# Patient Record
Sex: MALE
[De-identification: names, ages, dates, MRNs, and addresses within clinical notes are randomized per-mention and may not be internally consistent; named-entity substitution may affect disease eponyms.]

---

## 2022-12-08 NOTE — NUR
12/08/22 1414 Marlene Shankar
PATIENT TOLD RN THAT HE ATE 1/2 A SANDWICH AND 1/2 A CAPPACINO
AT 0700 THIS AM, ANESTHESIA AND SURGEON NOTIFIED.

## 2022-12-08 NOTE — NUR
12/08/22 1535 Thomas Bates
SECOND SET OF VITALS DID NOT SAVE IN COMPUTER. HOWEVER, ALL VS WNL. PT
DENIED PAIN AND NAUSEA THROUGHOUT STAY IN STEP DOWN.

## 2023-01-24 ENCOUNTER — HOSPITAL ENCOUNTER (OUTPATIENT)
Dept: HOSPITAL 95 - ER | Age: 63
Setting detail: OBSERVATION
LOS: 2 days | Discharge: HOME | End: 2023-01-26
Attending: INTERNAL MEDICINE | Admitting: HOSPITALIST
Payer: MEDICARE

## 2023-01-24 VITALS — WEIGHT: 189.99 LBS | HEIGHT: 66 IN | BODY MASS INDEX: 30.53 KG/M2

## 2023-01-24 DIAGNOSIS — Z79.899: ICD-10-CM

## 2023-01-24 DIAGNOSIS — D61.818: ICD-10-CM

## 2023-01-24 DIAGNOSIS — K76.82: Primary | ICD-10-CM

## 2023-01-24 DIAGNOSIS — Z86.73: ICD-10-CM

## 2023-01-24 DIAGNOSIS — E11.40: ICD-10-CM

## 2023-01-24 DIAGNOSIS — Z79.84: ICD-10-CM

## 2023-01-24 DIAGNOSIS — Z59.00: ICD-10-CM

## 2023-01-24 DIAGNOSIS — K74.60: ICD-10-CM

## 2023-01-24 PROCEDURE — G0378 HOSPITAL OBSERVATION PER HR: HCPCS

## 2023-01-24 PROCEDURE — G0480 DRUG TEST DEF 1-7 CLASSES: HCPCS

## 2023-01-24 PROCEDURE — A9270 NON-COVERED ITEM OR SERVICE: HCPCS

## 2023-01-24 SDOH — ECONOMIC STABILITY - HOUSING INSECURITY: HOMELESSNESS UNSPECIFIED: Z59.00

## 2023-01-25 LAB
ALBUMIN SERPL BCP-MCNC: 3.2 G/DL (ref 3.4–5)
ALBUMIN/GLOB SERPL: 0.9 {RATIO} (ref 0.8–1.8)
ALT SERPL W P-5'-P-CCNC: 32 U/L (ref 12–78)
ANION GAP SERPL CALCULATED.4IONS-SCNC: 6 MMOL/L (ref 6–16)
AST SERPL W P-5'-P-CCNC: 30 U/L (ref 12–37)
BASOPHILS # BLD AUTO: 0.02 K/MM3 (ref 0–0.23)
BASOPHILS NFR BLD AUTO: 1 % (ref 0–2)
BILIRUB SERPL-MCNC: 0.7 MG/DL (ref 0.1–1)
BUN SERPL-MCNC: 17 MG/DL (ref 8–24)
CALCIUM SERPL-MCNC: 8.3 MG/DL (ref 8.5–10.1)
CHLORIDE SERPL-SCNC: 112 MMOL/L (ref 98–108)
CO2 SERPL-SCNC: 23 MMOL/L (ref 21–32)
CREAT SERPL-MCNC: 0.55 MG/DL (ref 0.6–1.2)
DEPRECATED RDW RBC AUTO: 44.6 FL (ref 35.1–46.3)
EOSINOPHIL # BLD AUTO: 0.1 K/MM3 (ref 0–0.68)
EOSINOPHIL NFR BLD AUTO: 3 % (ref 0–6)
ERYTHROCYTE [DISTWIDTH] IN BLOOD BY AUTOMATED COUNT: 13.7 % (ref 11.7–14.2)
ETHANOL SERPL-MCNC: <3 MG/DL
GLOBULIN SER CALC-MCNC: 3.7 G/DL (ref 2.2–4)
GLUCOSE SERPL-MCNC: 121 MG/DL (ref 70–99)
HCT VFR BLD AUTO: 36.8 % (ref 37–53)
HGB BLD-MCNC: 12.9 G/DL (ref 13.5–17.5)
IMM GRANULOCYTES # BLD AUTO: 0.01 K/MM3 (ref 0–0.1)
IMM GRANULOCYTES NFR BLD AUTO: 0 % (ref 0–1)
LYMPHOCYTES # BLD AUTO: 1.44 K/MM3 (ref 0.84–5.2)
LYMPHOCYTES NFR BLD AUTO: 39 % (ref 21–46)
MAGNESIUM SERPL-MCNC: 2.2 MG/DL (ref 1.6–2.4)
MCHC RBC AUTO-ENTMCNC: 35.1 G/DL (ref 31.5–36.5)
MCV RBC AUTO: 90 FL (ref 80–100)
MONOCYTES # BLD AUTO: 0.34 K/MM3 (ref 0.16–1.47)
MONOCYTES NFR BLD AUTO: 9 % (ref 4–13)
NEUTROPHILS # BLD AUTO: 1.75 K/MM3 (ref 1.96–9.15)
NEUTROPHILS NFR BLD AUTO: 48 % (ref 41–73)
NRBC # BLD AUTO: 0 K/MM3 (ref 0–0.02)
NRBC BLD AUTO-RTO: 0 /100 WBC (ref 0–0.2)
PLATELET # BLD AUTO: 103 K/MM3 (ref 150–400)
POTASSIUM SERPL-SCNC: 3.7 MMOL/L (ref 3.5–5.5)
PROT SERPL-MCNC: 6.9 G/DL (ref 6.4–8.2)
SODIUM SERPL-SCNC: 141 MMOL/L (ref 136–145)

## 2023-01-25 NOTE — NUR
Patient received from ed, assessment done, patient pleasant and cooperative,
very thankful for care.

## 2023-01-25 NOTE — NUR
SHIFT SUMMARY-
PT IS ALERT AND PLEASENTLY CONFUSED AT TIMES. PT BECOMES VERY UPSET WHEN MEAL
TRAYS DONT ARRIVE WHEN HE THINKS THEY SHOULD, HE DID THIS TWICE TODAY WHERE HE
GOT UP AND BEGAN TO GET DRESSED SAYING "THAT'S IT! I AM LEAVING!" WHEN FOOD
ARRIVES HE CALMES DOWN, EATS AND GOES BACK TO SLEEP. PT DID GET INTO THE
SHOWER TODAY ON HIS OWN, IV WAS WRAPPED AFTER HE HAD GOTTEN IT WET. IT STILL
FLUSHED WELL, THEN HE CAUGHT IT ON HIS BLANKETS, AGAIN IT STILL FLUSHED WELL,
REINFORCED THE IV DRESSING WITH COBAN. PT IS CURRENTLY LYING IN BED, CALL
LIGHT IN REACH NO S&S OF DISTRESS. PT LOVES TO LAUGH AND JOKE WITH STAFF OFTEN
PRETENDING TO BE ASLEEP WHEN STAFF COME IN TO SEE HIM. WILL CTM AND PASS ON TO
NIGHT RN IN BEDSIDE REPORT.

## 2023-01-26 LAB
ALBUMIN SERPL BCP-MCNC: 2.9 G/DL (ref 3.4–5)
ANION GAP SERPL CALCULATED.4IONS-SCNC: 5 MMOL/L (ref 6–16)
BUN SERPL-MCNC: 12 MG/DL (ref 8–24)
CALCIUM SERPL-MCNC: 8.1 MG/DL (ref 8.5–10.1)
CHLORIDE SERPL-SCNC: 111 MMOL/L (ref 98–108)
CO2 SERPL-SCNC: 24 MMOL/L (ref 21–32)
CREAT SERPL-MCNC: 0.63 MG/DL (ref 0.6–1.2)
GLUCOSE SERPL-MCNC: 117 MG/DL (ref 70–99)
HCT VFR BLD AUTO: 36 % (ref 37–53)
HGB BLD-MCNC: 12.7 G/DL (ref 13.5–17.5)
PHOSPHATE SERPL-MCNC: 3.3 MG/DL (ref 2.5–4.9)
POTASSIUM SERPL-SCNC: 3.8 MMOL/L (ref 3.5–5.5)
SODIUM SERPL-SCNC: 140 MMOL/L (ref 136–145)

## 2023-01-26 NOTE — NUR
DISCHARGE NOTE-
PT AND HIS FRIEND WERE GIVEN VERBAL AND WRITTEN DISCHARGE INSTRUCTIONS AND
ACKNOWLWEDGED UNDERSTANDING OF THEM. MEDS WERE FAXED TO [facility] PER PT
AND HIS FIREND'S REQUEST. PT PCP HAS CHANGED TO DR MATTHEWS AT [facility], THE PT
CALLED AND SCHEDULED A FOLLOW UP APPOINTMENT ON 1-31-23 AT 2:20. THIS WAS
WRITTEN INTO THE DISCHARGE SUMMARY. PT IV WAS DC'D PRIOR TO DISCHARGE. AFTER
EDUCATION THE PT WAS ESCORTED OUT TO THE [facility] WHERE HIS FRIEND WAS
TRANSPORTING HIM TO HIS TRAILER.

## 2023-01-26 NOTE — NUR
This writer in chart for med information for caregiver.  He had questions
about the lactulose dose. answer found and given to Mr. Car.

## 2023-02-23 ENCOUNTER — HOSPITAL ENCOUNTER (OUTPATIENT)
Dept: HOSPITAL 95 - ORSCSDS | Age: 63
Discharge: HOME | End: 2023-02-23
Attending: OPHTHALMOLOGY
Payer: MEDICARE

## 2023-02-23 VITALS — HEIGHT: 66 IN | BODY MASS INDEX: 33.27 KG/M2 | WEIGHT: 207.01 LBS

## 2023-02-23 DIAGNOSIS — Z79.84: ICD-10-CM

## 2023-02-23 DIAGNOSIS — Z86.73: ICD-10-CM

## 2023-02-23 DIAGNOSIS — E11.9: ICD-10-CM

## 2023-02-23 DIAGNOSIS — Z79.899: ICD-10-CM

## 2023-02-23 DIAGNOSIS — E11.36: Primary | ICD-10-CM

## 2023-02-23 DIAGNOSIS — I10: ICD-10-CM

## 2023-02-23 DIAGNOSIS — H25.12: ICD-10-CM

## 2023-02-23 DIAGNOSIS — K74.60: ICD-10-CM

## 2023-02-23 PROCEDURE — V2632 POST CHMBR INTRAOCULAR LENS: HCPCS

## 2023-02-23 PROCEDURE — 08DK3ZZ EXTRACTION OF LEFT LENS, PERCUTANEOUS APPROACH: ICD-10-PCS | Performed by: OPHTHALMOLOGY

## 2023-03-28 ENCOUNTER — HOSPITAL ENCOUNTER (EMERGENCY)
Dept: HOSPITAL 95 - ER | Age: 63
Discharge: HOME | End: 2023-03-28
Payer: MEDICARE

## 2023-03-28 VITALS — WEIGHT: 206.99 LBS | HEIGHT: 66 IN | BODY MASS INDEX: 33.27 KG/M2

## 2023-03-28 DIAGNOSIS — Z79.84: ICD-10-CM

## 2023-03-28 DIAGNOSIS — E11.9: ICD-10-CM

## 2023-03-28 DIAGNOSIS — Z79.899: ICD-10-CM

## 2023-03-28 DIAGNOSIS — R42: Primary | ICD-10-CM

## 2023-03-28 DIAGNOSIS — Z59.00: ICD-10-CM

## 2023-03-28 DIAGNOSIS — R53.1: ICD-10-CM

## 2023-03-28 DIAGNOSIS — F17.200: ICD-10-CM

## 2023-03-28 LAB
ALBUMIN SERPL BCP-MCNC: 3.1 G/DL (ref 3.4–5)
ALBUMIN/GLOB SERPL: 0.9 {RATIO} (ref 0.8–1.8)
ALT SERPL W P-5'-P-CCNC: 36 U/L (ref 12–78)
ANION GAP SERPL CALCULATED.4IONS-SCNC: 5 MMOL/L (ref 6–16)
AST SERPL W P-5'-P-CCNC: 53 U/L (ref 12–37)
BASOPHILS # BLD AUTO: 0.01 K/MM3 (ref 0–0.23)
BASOPHILS NFR BLD AUTO: 0 % (ref 0–2)
BILIRUB SERPL-MCNC: 0.9 MG/DL (ref 0.1–1)
BUN SERPL-MCNC: 16 MG/DL (ref 8–24)
CALCIUM SERPL-MCNC: 8.3 MG/DL (ref 8.5–10.1)
CHLORIDE SERPL-SCNC: 110 MMOL/L (ref 98–108)
CO2 SERPL-SCNC: 22 MMOL/L (ref 21–32)
CREAT SERPL-MCNC: 0.45 MG/DL (ref 0.6–1.2)
DEPRECATED RDW RBC AUTO: 45.4 FL (ref 35.1–46.3)
EOSINOPHIL # BLD AUTO: 0.08 K/MM3 (ref 0–0.68)
EOSINOPHIL NFR BLD AUTO: 3 % (ref 0–6)
ERYTHROCYTE [DISTWIDTH] IN BLOOD BY AUTOMATED COUNT: 13.6 % (ref 11.7–14.2)
GLOBULIN SER CALC-MCNC: 3.5 G/DL (ref 2.2–4)
GLUCOSE SERPL-MCNC: 116 MG/DL (ref 70–99)
HCT VFR BLD AUTO: 34.9 % (ref 37–53)
HGB BLD-MCNC: 12.3 G/DL (ref 13.5–17.5)
IMM GRANULOCYTES # BLD AUTO: 0.01 K/MM3 (ref 0–0.1)
IMM GRANULOCYTES NFR BLD AUTO: 0 % (ref 0–1)
LYMPHOCYTES # BLD AUTO: 0.87 K/MM3 (ref 0.84–5.2)
LYMPHOCYTES NFR BLD AUTO: 34 % (ref 21–46)
MCHC RBC AUTO-ENTMCNC: 35.2 G/DL (ref 31.5–36.5)
MCV RBC AUTO: 91 FL (ref 80–100)
MONOCYTES # BLD AUTO: 0.32 K/MM3 (ref 0.16–1.47)
MONOCYTES NFR BLD AUTO: 12 % (ref 4–13)
NEUTROPHILS # BLD AUTO: 1.29 K/MM3 (ref 1.96–9.15)
NEUTROPHILS NFR BLD AUTO: 50 % (ref 41–73)
NRBC # BLD AUTO: 0 K/MM3 (ref 0–0.02)
NRBC BLD AUTO-RTO: 0 /100 WBC (ref 0–0.2)
PLATELET # BLD AUTO: 106 K/MM3 (ref 150–400)
POTASSIUM SERPL-SCNC: 3.9 MMOL/L (ref 3.5–5.5)
PROT SERPL-MCNC: 6.6 G/DL (ref 6.4–8.2)
SODIUM SERPL-SCNC: 137 MMOL/L (ref 136–145)

## 2023-03-28 PROCEDURE — A9270 NON-COVERED ITEM OR SERVICE: HCPCS

## 2023-03-28 SDOH — ECONOMIC STABILITY - HOUSING INSECURITY: HOMELESSNESS UNSPECIFIED: Z59.00

## 2023-07-23 ENCOUNTER — HOSPITAL ENCOUNTER (EMERGENCY)
Dept: HOSPITAL 95 - ER | Age: 63
Discharge: HOME | End: 2023-07-23
Payer: MEDICARE

## 2023-07-23 VITALS — BODY MASS INDEX: 31.34 KG/M2 | WEIGHT: 195 LBS | HEIGHT: 66 IN

## 2023-07-23 VITALS — SYSTOLIC BLOOD PRESSURE: 164 MMHG | DIASTOLIC BLOOD PRESSURE: 82 MMHG

## 2023-07-23 DIAGNOSIS — E11.9: ICD-10-CM

## 2023-07-23 DIAGNOSIS — Z79.899: ICD-10-CM

## 2023-07-23 DIAGNOSIS — Z59.02: ICD-10-CM

## 2023-07-23 DIAGNOSIS — Z23: ICD-10-CM

## 2023-07-23 DIAGNOSIS — S32.029A: ICD-10-CM

## 2023-07-23 DIAGNOSIS — S32.019A: Primary | ICD-10-CM

## 2023-07-23 DIAGNOSIS — Z86.73: ICD-10-CM

## 2023-07-23 DIAGNOSIS — F17.200: ICD-10-CM
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
## 2023-07-23 PROCEDURE — A9270 NON-COVERED ITEM OR SERVICE: HCPCS

## 2023-07-23 SDOH — ECONOMIC STABILITY - HOUSING INSECURITY: UNSHELTERED HOMELESSNESS: Z59.02

## 2023-07-25 ENCOUNTER — HOSPITAL ENCOUNTER (OUTPATIENT)
Dept: HOSPITAL 95 - ER | Age: 63
Setting detail: OBSERVATION
LOS: 1 days | Discharge: HOME | End: 2023-07-26
Attending: INTERNAL MEDICINE | Admitting: HOSPITALIST
Payer: COMMERCIAL

## 2023-07-25 VITALS — BODY MASS INDEX: 29.69 KG/M2 | HEIGHT: 66 IN | WEIGHT: 184.75 LBS

## 2023-07-25 VITALS — DIASTOLIC BLOOD PRESSURE: 72 MMHG | SYSTOLIC BLOOD PRESSURE: 140 MMHG

## 2023-07-25 DIAGNOSIS — G40.909: ICD-10-CM

## 2023-07-25 DIAGNOSIS — F32.A: ICD-10-CM
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
## 2023-07-25 DIAGNOSIS — S32.029A: Primary | ICD-10-CM

## 2023-07-25 DIAGNOSIS — F17.200: ICD-10-CM

## 2023-07-25 DIAGNOSIS — D61.818: ICD-10-CM

## 2023-07-25 DIAGNOSIS — Z86.73: ICD-10-CM

## 2023-07-25 DIAGNOSIS — K70.30: ICD-10-CM

## 2023-07-25 DIAGNOSIS — Z79.4: ICD-10-CM

## 2023-07-25 LAB
ALBUMIN SERPL BCP-MCNC: 3.3 G/DL (ref 3.4–5)
ALBUMIN/GLOB SERPL: 0.9 {RATIO} (ref 0.8–1.8)
ALT SERPL W P-5'-P-CCNC: 48 U/L (ref 12–78)
ANION GAP SERPL CALCULATED.4IONS-SCNC: 7 MMOL/L (ref 6–16)
AST SERPL W P-5'-P-CCNC: 53 U/L (ref 12–37)
BILIRUB SERPL-MCNC: 0.7 MG/DL (ref 0.1–1)
BUN SERPL-MCNC: 18 MG/DL (ref 8–24)
CALCIUM SERPL-MCNC: 8.9 MG/DL (ref 8.5–10.1)
CHLORIDE SERPL-SCNC: 107 MMOL/L (ref 98–108)
CO2 SERPL-SCNC: 27 MMOL/L (ref 21–32)
CREAT SERPL-MCNC: 0.46 MG/DL (ref 0.6–1.2)
GLOBULIN SER CALC-MCNC: 3.8 G/DL (ref 2.2–4)
GLUCOSE SERPL-MCNC: 174 MG/DL (ref 70–99)
POTASSIUM SERPL-SCNC: 3.8 MMOL/L (ref 3.5–5.5)
PROT SERPL-MCNC: 7.1 G/DL (ref 6.4–8.2)
SODIUM SERPL-SCNC: 141 MMOL/L (ref 136–145)

## 2023-07-25 PROCEDURE — A9270 NON-COVERED ITEM OR SERVICE: HCPCS

## 2023-07-25 PROCEDURE — G0378 HOSPITAL OBSERVATION PER HR: HCPCS

## 2023-07-25 NOTE — NUR
ADMIT NOTE
HANDOFF RECEIVED FROM ER BRYAN MCDUFFIE. PT ARRIVED TO FLOOR VIA WC. SEIZURE
PRECAUTIONS IN PLACE. PT ORIENTED TO ROOM. PERSONAL POSSESSIONS WITH PT. FIRE
SAFETY AND IGNITION RISK ASSESSED AND DISCUSSED WITH PT. CALL BUTTON WITHIN
REACH.

## 2023-07-26 VITALS — SYSTOLIC BLOOD PRESSURE: 113 MMHG | DIASTOLIC BLOOD PRESSURE: 68 MMHG

## 2023-07-26 VITALS — DIASTOLIC BLOOD PRESSURE: 75 MMHG | SYSTOLIC BLOOD PRESSURE: 110 MMHG

## 2023-07-26 NOTE — NUR
SHIFT SUMMARY
ADMITTED FOR L2 COMPRESSION FRACTURE PAIN. FULL CODE. WE ARE MONITORING HIS
[facility] LABS. HE WAS UNABLE TO GET HIS MISSING HOME MEDICATIONS PREVIOUS TO
ADMIT AND HAD BEEN SKIPPING HOME MEDS. HX: ETOH CIRRHOSIS - REMOTE. WE ARE
ENCOURAGING HIM TO TAKE HIS HOME LACTULOSE. HE IS ABLE TO AMBULATE TO THE
RESTROOM WITH 1 ASSIST. HE IS SOMEWHAT UNSTEADY, PHYSICAL THERAPY IS ORDERED.
A&O X3, HE CAN MAKE HIS NEEDS KNOWN. HE IS CURRENTLY HOMELESS, STAYING AT THE
MISSION. HE IS COOPERATIVE WITH CARE

## 2023-08-26 ENCOUNTER — HOSPITAL ENCOUNTER (EMERGENCY)
Dept: HOSPITAL 95 - ER | Age: 63
Discharge: HOME | End: 2023-08-26
Payer: MEDICARE

## 2023-08-26 VITALS — HEIGHT: 66 IN | WEIGHT: 189.99 LBS | BODY MASS INDEX: 30.53 KG/M2

## 2023-08-26 VITALS — DIASTOLIC BLOOD PRESSURE: 65 MMHG | SYSTOLIC BLOOD PRESSURE: 132 MMHG

## 2023-08-26 DIAGNOSIS — F17.200: ICD-10-CM

## 2023-08-26 DIAGNOSIS — E11.9: ICD-10-CM

## 2023-08-26 DIAGNOSIS — Z86.73: ICD-10-CM

## 2023-08-26 DIAGNOSIS — M54.9: Primary | ICD-10-CM

## 2023-08-26 DIAGNOSIS — Z79.84: ICD-10-CM

## 2023-09-09 ENCOUNTER — HOSPITAL ENCOUNTER (EMERGENCY)
Dept: HOSPITAL 95 - ER | Age: 63
Discharge: HOME | End: 2023-09-09
Payer: MEDICARE

## 2023-09-09 VITALS — HEIGHT: 66 IN | BODY MASS INDEX: 31.34 KG/M2 | WEIGHT: 195 LBS

## 2023-09-09 VITALS — SYSTOLIC BLOOD PRESSURE: 123 MMHG | DIASTOLIC BLOOD PRESSURE: 67 MMHG

## 2023-09-09 DIAGNOSIS — E11.9: ICD-10-CM
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
## 2023-09-09 DIAGNOSIS — F17.290: ICD-10-CM

## 2023-09-09 DIAGNOSIS — Z86.73: ICD-10-CM

## 2023-09-09 DIAGNOSIS — Z79.84: ICD-10-CM

## 2023-09-09 DIAGNOSIS — Z79.899: ICD-10-CM

## 2023-09-09 DIAGNOSIS — R07.81: Primary | ICD-10-CM

## 2023-09-11 ENCOUNTER — HOSPITAL ENCOUNTER (EMERGENCY)
Dept: HOSPITAL 95 - ER | Age: 63
Discharge: HOME | End: 2023-09-11
Payer: MEDICARE

## 2023-09-11 VITALS — HEIGHT: 66 IN | BODY MASS INDEX: 31.82 KG/M2 | WEIGHT: 198 LBS

## 2023-09-11 VITALS — SYSTOLIC BLOOD PRESSURE: 147 MMHG | DIASTOLIC BLOOD PRESSURE: 80 MMHG

## 2023-09-11 DIAGNOSIS — Z79.84: ICD-10-CM

## 2023-09-11 DIAGNOSIS — F17.290: ICD-10-CM

## 2023-09-11 DIAGNOSIS — S20.211A: ICD-10-CM

## 2023-09-11 DIAGNOSIS — Z59.00: ICD-10-CM
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
## 2023-09-11 DIAGNOSIS — Z86.73: ICD-10-CM

## 2023-09-11 DIAGNOSIS — Z79.899: ICD-10-CM

## 2023-09-11 DIAGNOSIS — E11.9: ICD-10-CM

## 2023-09-11 DIAGNOSIS — S32.009A: Primary | ICD-10-CM

## 2023-09-11 PROCEDURE — A9270 NON-COVERED ITEM OR SERVICE: HCPCS

## 2023-09-11 SDOH — ECONOMIC STABILITY - HOUSING INSECURITY: HOMELESSNESS UNSPECIFIED: Z59.00

## 2023-10-01 ENCOUNTER — HOSPITAL ENCOUNTER (EMERGENCY)
Dept: HOSPITAL 95 - ER | Age: 63
LOS: 1 days | Discharge: HOME | End: 2023-10-02
Payer: MEDICARE

## 2023-10-01 VITALS — HEIGHT: 69 IN | WEIGHT: 195 LBS | BODY MASS INDEX: 28.88 KG/M2

## 2023-10-01 DIAGNOSIS — F17.200: ICD-10-CM

## 2023-10-01 DIAGNOSIS — G40.909: ICD-10-CM

## 2023-10-01 DIAGNOSIS — K76.82: Primary | ICD-10-CM

## 2023-10-01 DIAGNOSIS — E11.9: ICD-10-CM

## 2023-10-01 DIAGNOSIS — Z79.899: ICD-10-CM

## 2023-10-01 DIAGNOSIS — Z79.84: ICD-10-CM

## 2023-10-01 LAB
ALBUMIN SERPL BCP-MCNC: 3.3 G/DL (ref 3.4–5)
ALBUMIN/GLOB SERPL: 0.9 {RATIO} (ref 0.8–1.8)
ALT SERPL W P-5'-P-CCNC: 40 U/L (ref 12–78)
ANION GAP SERPL CALCULATED.4IONS-SCNC: 4 MMOL/L (ref 6–16)
AST SERPL W P-5'-P-CCNC: 47 U/L (ref 12–37)
BASOPHILS # BLD AUTO: 0.02 K/MM3 (ref 0–0.23)
BASOPHILS NFR BLD AUTO: 1 % (ref 0–2)
BILIRUB SERPL-MCNC: 0.6 MG/DL (ref 0.1–1)
BUN SERPL-MCNC: 18 MG/DL (ref 8–24)
CALCIUM SERPL-MCNC: 8.4 MG/DL (ref 8.5–10.1)
CANNABINOIDS UR QL: DETECTED
CHLORIDE SERPL-SCNC: 112 MMOL/L (ref 98–108)
CO2 SERPL-SCNC: 25 MMOL/L (ref 21–32)
CREAT SERPL-MCNC: 0.5 MG/DL (ref 0.6–1.2)
DEPRECATED RDW RBC AUTO: 48 FL (ref 35.1–46.3)
EOSINOPHIL # BLD AUTO: 0.09 K/MM3 (ref 0–0.68)
EOSINOPHIL NFR BLD AUTO: 2 % (ref 0–6)
ERYTHROCYTE [DISTWIDTH] IN BLOOD BY AUTOMATED COUNT: 13.8 % (ref 11.7–14.2)
ETHANOL SERPL-MCNC: <3 MG/DL
GLOBULIN SER CALC-MCNC: 3.7 G/DL (ref 2.2–4)
GLUCOSE SERPL-MCNC: 172 MG/DL (ref 70–99)
GLUCOSE UR-MCNC: (no result) MG/DL
HCT VFR BLD AUTO: 36.7 % (ref 37–53)
HGB BLD-MCNC: 12.7 G/DL (ref 13.5–17.5)
IMM GRANULOCYTES # BLD AUTO: 0.01 K/MM3 (ref 0–0.1)
IMM GRANULOCYTES NFR BLD AUTO: 0 % (ref 0–1)
LYMPHOCYTES # BLD AUTO: 1.27 K/MM3 (ref 0.84–5.2)
LYMPHOCYTES NFR BLD AUTO: 30 % (ref 21–46)
MCHC RBC AUTO-ENTMCNC: 34.6 G/DL (ref 31.5–36.5)
MCV RBC AUTO: 94 FL (ref 80–100)
MONOCYTES # BLD AUTO: 0.43 K/MM3 (ref 0.16–1.47)
MONOCYTES NFR BLD AUTO: 10 % (ref 4–13)
NEUTROPHILS # BLD AUTO: 2.46 K/MM3 (ref 1.96–9.15)
NEUTROPHILS NFR BLD AUTO: 58 % (ref 41–73)
NRBC # BLD AUTO: 0 K/MM3 (ref 0–0.02)
NRBC BLD AUTO-RTO: 0 /100 WBC (ref 0–0.2)
PLATELET # BLD AUTO: 144 K/MM3 (ref 150–400)
POTASSIUM SERPL-SCNC: 4.3 MMOL/L (ref 3.5–5.5)
PROT SERPL-MCNC: 7 G/DL (ref 6.4–8.2)
SODIUM SERPL-SCNC: 141 MMOL/L (ref 136–145)
SP GR SPEC: 1.01 (ref 1–1.02)
UROBILINOGEN UR STRIP-MCNC: (no result) MG/DL

## 2023-10-01 PROCEDURE — A9270 NON-COVERED ITEM OR SERVICE: HCPCS

## 2023-10-02 VITALS — SYSTOLIC BLOOD PRESSURE: 153 MMHG | DIASTOLIC BLOOD PRESSURE: 81 MMHG

## 2023-10-13 ENCOUNTER — HOSPITAL ENCOUNTER (OUTPATIENT)
Dept: HOSPITAL 95 - LAB SHORT | Age: 63
End: 2023-10-13
Attending: FAMILY MEDICINE
Payer: MEDICARE

## 2023-10-13 DIAGNOSIS — K74.60: ICD-10-CM

## 2023-10-13 DIAGNOSIS — E11.8: ICD-10-CM

## 2023-10-13 DIAGNOSIS — G40.909: Primary | ICD-10-CM

## 2023-10-13 LAB
ALBUMIN SERPL BCP-MCNC: 3.2 G/DL (ref 3.4–5)
ALBUMIN/GLOB SERPL: 0.9 {RATIO} (ref 0.8–1.8)
ALT SERPL W P-5'-P-CCNC: 47 U/L (ref 12–78)
ANION GAP SERPL CALCULATED.4IONS-SCNC: 3 MMOL/L (ref 6–16)
AST SERPL W P-5'-P-CCNC: 41 U/L (ref 12–37)
BASOPHILS # BLD AUTO: 0.02 K/MM3 (ref 0–0.23)
BASOPHILS NFR BLD AUTO: 1 % (ref 0–2)
BILIRUB SERPL-MCNC: 0.5 MG/DL (ref 0.1–1)
BUN SERPL-MCNC: 16 MG/DL (ref 8–24)
CALCIUM SERPL-MCNC: 8.7 MG/DL (ref 8.5–10.1)
CHLORIDE SERPL-SCNC: 111 MMOL/L (ref 98–108)
CO2 SERPL-SCNC: 28 MMOL/L (ref 21–32)
CREAT SERPL-MCNC: 0.62 MG/DL (ref 0.6–1.2)
DEPRECATED RDW RBC AUTO: 49.6 FL (ref 35.1–46.3)
EOSINOPHIL # BLD AUTO: 0.14 K/MM3 (ref 0–0.68)
EOSINOPHIL NFR BLD AUTO: 4 % (ref 0–6)
ERYTHROCYTE [DISTWIDTH] IN BLOOD BY AUTOMATED COUNT: 14.2 % (ref 11.7–14.2)
GLOBULIN SER CALC-MCNC: 3.6 G/DL (ref 2.2–4)
GLUCOSE SERPL-MCNC: 170 MG/DL (ref 70–99)
HCT VFR BLD AUTO: 38.1 % (ref 37–53)
HGB BLD-MCNC: 12.7 G/DL (ref 13.5–17.5)
IMM GRANULOCYTES # BLD AUTO: 0.02 K/MM3 (ref 0–0.1)
IMM GRANULOCYTES NFR BLD AUTO: 1 % (ref 0–1)
LYMPHOCYTES # BLD AUTO: 0.94 K/MM3 (ref 0.84–5.2)
LYMPHOCYTES NFR BLD AUTO: 24 % (ref 21–46)
MCHC RBC AUTO-ENTMCNC: 33.3 G/DL (ref 31.5–36.5)
MCV RBC AUTO: 96 FL (ref 80–100)
MONOCYTES # BLD AUTO: 0.37 K/MM3 (ref 0.16–1.47)
MONOCYTES NFR BLD AUTO: 9 % (ref 4–13)
NEUTROPHILS # BLD AUTO: 2.46 K/MM3 (ref 1.96–9.15)
NEUTROPHILS NFR BLD AUTO: 62 % (ref 41–73)
NRBC # BLD AUTO: 0 K/MM3 (ref 0–0.02)
NRBC BLD AUTO-RTO: 0 /100 WBC (ref 0–0.2)
PLATELET # BLD AUTO: 138 K/MM3 (ref 150–400)
POTASSIUM SERPL-SCNC: 4.5 MMOL/L (ref 3.5–5.5)
PROT SERPL-MCNC: 6.8 G/DL (ref 6.4–8.2)
PROTHROMBIN TIME: 11.4 SEC (ref 9.7–11.5)
SODIUM SERPL-SCNC: 142 MMOL/L (ref 136–145)